# Patient Record
Sex: FEMALE | Race: WHITE | URBAN - METROPOLITAN AREA
[De-identification: names, ages, dates, MRNs, and addresses within clinical notes are randomized per-mention and may not be internally consistent; named-entity substitution may affect disease eponyms.]

---

## 2017-03-09 ENCOUNTER — HOSPITAL ENCOUNTER (OUTPATIENT)
Dept: SURGERY | Age: 32
Discharge: HOME | End: 2017-03-09
Attending: PODIATRIST | Admitting: PODIATRIST

## 2017-03-09 VITALS
SYSTOLIC BLOOD PRESSURE: 125 MMHG | HEIGHT: 68 IN | HEART RATE: 95 BPM | BODY MASS INDEX: 27.92 KG/M2 | TEMPERATURE: 97.9 F | DIASTOLIC BLOOD PRESSURE: 88 MMHG | WEIGHT: 184.2 LBS | RESPIRATION RATE: 17 BRPM | OXYGEN SATURATION: 99 %

## 2017-03-09 LAB — HCG(URINE) PREGNANCY TEST: NEGATIVE

## 2017-03-09 RX ORDER — SODIUM CHLORIDE 9 MG/ML
INJECTION, SOLUTION INTRAVENOUS CONTINUOUS
Status: CANCELLED | OUTPATIENT
Start: 2017-03-09

## 2017-03-09 RX ORDER — SODIUM CHLORIDE 0.9 % (FLUSH) 0.9 %
10 SYRINGE (ML) INJECTION EVERY 12 HOURS SCHEDULED
Status: CANCELLED | OUTPATIENT
Start: 2017-03-09

## 2017-03-09 RX ORDER — FENTANYL CITRATE 50 UG/ML
25 INJECTION, SOLUTION INTRAMUSCULAR; INTRAVENOUS EVERY 5 MIN PRN
Status: DISCONTINUED | OUTPATIENT
Start: 2017-03-09 | End: 2017-03-10 | Stop reason: HOSPADM

## 2017-03-09 RX ORDER — SODIUM CHLORIDE, SODIUM LACTATE, POTASSIUM CHLORIDE, CALCIUM CHLORIDE 600; 310; 30; 20 MG/100ML; MG/100ML; MG/100ML; MG/100ML
INJECTION, SOLUTION INTRAVENOUS CONTINUOUS
Status: DISCONTINUED | OUTPATIENT
Start: 2017-03-09 | End: 2017-03-10 | Stop reason: HOSPADM

## 2017-03-09 RX ORDER — LIDOCAINE HYDROCHLORIDE 10 MG/ML
0.5 INJECTION, SOLUTION EPIDURAL; INFILTRATION; INTRACAUDAL; PERINEURAL ONCE
Status: DISCONTINUED | OUTPATIENT
Start: 2017-03-09 | End: 2017-03-10 | Stop reason: HOSPADM

## 2017-03-09 RX ORDER — SODIUM CHLORIDE 0.9 % (FLUSH) 0.9 %
10 SYRINGE (ML) INJECTION PRN
Status: CANCELLED | OUTPATIENT
Start: 2017-03-09

## 2017-03-09 RX ORDER — HYDROMORPHONE HCL 110MG/55ML
0.5 PATIENT CONTROLLED ANALGESIA SYRINGE INTRAVENOUS EVERY 5 MIN PRN
Status: DISCONTINUED | OUTPATIENT
Start: 2017-03-09 | End: 2017-03-10 | Stop reason: HOSPADM

## 2017-03-09 RX ORDER — MORPHINE SULFATE 2 MG/ML
1 INJECTION, SOLUTION INTRAMUSCULAR; INTRAVENOUS EVERY 5 MIN PRN
Status: DISCONTINUED | OUTPATIENT
Start: 2017-03-09 | End: 2017-03-10 | Stop reason: HOSPADM

## 2017-03-09 RX ORDER — HYDROCODONE BITARTRATE AND ACETAMINOPHEN 5; 325 MG/1; MG/1
1-2 TABLET ORAL EVERY 4 HOURS PRN
Qty: 35 TABLET | Refills: 0 | Status: SHIPPED | OUTPATIENT
Start: 2017-03-09 | End: 2017-03-16

## 2017-03-09 RX ORDER — ONDANSETRON 2 MG/ML
4 INJECTION INTRAMUSCULAR; INTRAVENOUS
Status: ACTIVE | OUTPATIENT
Start: 2017-03-09 | End: 2017-03-09

## 2017-03-09 RX ORDER — CEFAZOLIN SODIUM 2 G/100ML
2 INJECTION, SOLUTION INTRAVENOUS
Status: COMPLETED | OUTPATIENT
Start: 2017-03-09 | End: 2017-03-09

## 2017-03-09 RX ORDER — DIPHENHYDRAMINE HYDROCHLORIDE 50 MG/ML
25 INJECTION INTRAMUSCULAR; INTRAVENOUS PRN
Status: DISCONTINUED | OUTPATIENT
Start: 2017-03-09 | End: 2017-03-10 | Stop reason: HOSPADM

## 2017-03-09 RX ADMIN — CEFAZOLIN SODIUM 2 G: 2 INJECTION, SOLUTION INTRAVENOUS at 15:30

## 2017-03-09 RX ADMIN — SODIUM CHLORIDE, SODIUM LACTATE, POTASSIUM CHLORIDE, CALCIUM CHLORIDE: 600; 310; 30; 20 INJECTION, SOLUTION INTRAVENOUS at 14:51

## 2017-03-09 ASSESSMENT — PAIN SCALES - GENERAL
PAINLEVEL_OUTOF10: 0

## 2017-03-09 ASSESSMENT — PAIN - FUNCTIONAL ASSESSMENT: PAIN_FUNCTIONAL_ASSESSMENT: 0-10

## 2017-03-15 LAB
HPV COMMENT: ABNORMAL
HPV TYPE 16: NOT DETECTED
HPV TYPE 18: NOT DETECTED
HPVOH (OTHER TYPES): DETECTED

## 2017-05-05 LAB
HCT VFR BLD CALC: 40.5 % (ref 35.8–46.5)
HEMOGLOBIN: 13.7 G/DL (ref 12.1–15.8)
MCH RBC QN AUTO: 30.7 PG (ref 28.4–33.4)
MCHC RBC AUTO-ENTMCNC: 33.9 G/DL (ref 31.1–37)
MCV RBC AUTO: 90.4 FL (ref 85–99)
PDW BLD-RTO: 13.2 % (ref 11.7–15.2)
PLATELET # BLD: 202 10*3/UL (ref 154–393)
RBC # BLD: 4.48 10*6/UL (ref 3.86–5.17)
WBC: 5.7 10*3/UL (ref 4–10.5)

## 2017-05-11 LAB — PREGNANCY, URINE: NEGATIVE

## 2017-09-18 ENCOUNTER — HOSPITAL ENCOUNTER (OUTPATIENT)
Dept: OTHER | Age: 32
Discharge: HOME | End: 2017-09-18
Attending: OBSTETRICS & GYNECOLOGY | Admitting: OBSTETRICS & GYNECOLOGY

## 2017-09-18 LAB
A/G RATIO: 1.4 (ref 1.1–2.2)
ABO/RH: NORMAL
ALBUMIN SERPL-MCNC: 3.9 G/DL (ref 3.4–5)
ALP BLD-CCNC: 67 U/L (ref 40–129)
ALT SERPL-CCNC: 10 U/L (ref 10–40)
ANION GAP SERPL CALCULATED.3IONS-SCNC: 12 MMOL/L (ref 3–16)
ANTIBODY SCREEN: NORMAL
AST SERPL-CCNC: 13 U/L (ref 15–37)
BASOPHILS ABSOLUTE: 0.1 K/UL (ref 0–0.2)
BASOPHILS RELATIVE PERCENT: 1 %
BILIRUB SERPL-MCNC: 0.3 MG/DL (ref 0–1)
BUN BLDV-MCNC: 15 MG/DL (ref 7–20)
CALCIUM SERPL-MCNC: 8.8 MG/DL (ref 8.3–10.6)
CHLORIDE BLD-SCNC: 104 MMOL/L (ref 99–110)
CO2: 25 MMOL/L (ref 21–32)
CREAT SERPL-MCNC: 0.8 MG/DL (ref 0.6–1.1)
EOSINOPHILS ABSOLUTE: 0.1 K/UL (ref 0–0.6)
EOSINOPHILS RELATIVE PERCENT: 2.1 %
GFR AFRICAN AMERICAN: >60
GFR NON-AFRICAN AMERICAN: >60
GLOBULIN: 2.7 G/DL
GLUCOSE BLD-MCNC: 100 MG/DL (ref 70–99)
HCT VFR BLD CALC: 40.9 % (ref 36–48)
HEMOGLOBIN: 13.8 G/DL (ref 12–16)
LYMPHOCYTES ABSOLUTE: 1.4 K/UL (ref 1–5.1)
LYMPHOCYTES RELATIVE PERCENT: 23.5 %
MCH RBC QN AUTO: 31.5 PG (ref 26–34)
MCHC RBC AUTO-ENTMCNC: 33.8 G/DL (ref 31–36)
MCV RBC AUTO: 93.4 FL (ref 80–100)
MONOCYTES ABSOLUTE: 0.2 K/UL (ref 0–1.3)
MONOCYTES RELATIVE PERCENT: 4.3 %
NEUTROPHILS ABSOLUTE: 4 K/UL (ref 1.7–7.7)
NEUTROPHILS RELATIVE PERCENT: 69.1 %
PDW BLD-RTO: 12.8 % (ref 12.4–15.4)
PLATELET # BLD: 208 K/UL (ref 135–450)
PMV BLD AUTO: 9.4 FL (ref 5–10.5)
POTASSIUM SERPL-SCNC: 3.9 MMOL/L (ref 3.5–5.1)
RBC # BLD: 4.37 M/UL (ref 4–5.2)
SODIUM BLD-SCNC: 141 MMOL/L (ref 136–145)
TOTAL PROTEIN: 6.6 G/DL (ref 6.4–8.2)
WBC # BLD: 5.8 K/UL (ref 4–11)

## 2017-09-28 PROBLEM — N87.9 CERVICAL DYSPLASIA: Status: ACTIVE | Noted: 2017-09-28

## 2018-05-24 LAB
HPV COMMENT: NORMAL
HPV TYPE 16: NOT DETECTED
HPV TYPE 18: NOT DETECTED
HPVOH (OTHER TYPES): NOT DETECTED

## 2019-07-15 LAB
HEPATITIS B SURFACE ANTIGEN INTERPRETATION: NORMAL
HEPATITIS C ANTIBODY INTERPRETATION: NORMAL

## 2019-07-16 LAB
HIV AG/AB: NORMAL
HIV ANTIGEN: NORMAL
HIV-1 ANTIBODY: NORMAL
HIV-2 AB: NORMAL
TOTAL SYPHILLIS IGG/IGM: NORMAL

## 2020-07-17 ENCOUNTER — OFFICE VISIT (OUTPATIENT)
Dept: BARIATRICS/WEIGHT MGMT | Age: 35
End: 2020-07-17
Payer: COMMERCIAL

## 2020-07-17 VITALS
BODY MASS INDEX: 32.49 KG/M2 | RESPIRATION RATE: 18 BRPM | SYSTOLIC BLOOD PRESSURE: 116 MMHG | HEIGHT: 68 IN | DIASTOLIC BLOOD PRESSURE: 78 MMHG | WEIGHT: 214.4 LBS | OXYGEN SATURATION: 97 % | HEART RATE: 90 BPM

## 2020-07-17 PROBLEM — E66.9 OBESITY (BMI 30-39.9): Status: ACTIVE | Noted: 2020-07-17

## 2020-07-17 PROCEDURE — 99204 OFFICE O/P NEW MOD 45 MIN: CPT | Performed by: SURGERY

## 2020-07-17 NOTE — PROGRESS NOTES
Efrain Ellison is a 30 y.o. female with a date of birth of 1985. There were no vitals filed for this visit. BMI: There is no height or weight on file to calculate BMI. Obesity Classification: Class I    Weight History: Wt Readings from Last 3 Encounters:   09/28/17 190 lb 7.6 oz (86.4 kg)   08/31/17 190 lb (86.2 kg)   03/09/17 184 lb 3.2 oz (83.6 kg)       Patient's lowest adult weight was 160 lb at age 34. Patient's highest adult weight was 214.4 lb at age 30. Patient has participated in the following weight loss programs: has not. Patient has not participated in meal replacement/liquid diets. Patient has not participated in weight loss medications. Patient is not lactose intolerant. Patient does not have [employer]/cultural food concerns. Patient does not have food allergies. Does have regular sleep schedule - midnight until 9a. Pt reports eating 2-3x/day, has noticed increased snacking since Covid 19 began. No structure exercise currently. 24 hour recall/food frequency chart:   Breakfast: yes. Skips frequently r/t covid 19 schedule - Luna crunch wrap ([employer]aco [employer])  Snack: no.   Lunch: yes. - Deli sandwich OR salad OR fast food  Snack: no.   Dinner: yes. 2 soft tacos, lettuce, cheese, meat, sour cream OR spaghetti and meatballs OR pizza OR grilled protein with corn on cob / baked potato  Snack: yes. sunflower seeds, slim hoda  Drinks throughout the day: 4 cokes daily  Do you drink alcohol? yes. How often/how much alcohol do you drink: 2 Beers and 2 Mixed drinks per month. Patient does not meet the criteria for binge eating disorder. Patient does not have grazing. Patient does not have night eating. Patient does have a history of eating out of boredom.       Goals  Weight: 175  Health Improvement: increased quality of life, increased energy, decreased back pain    Assessment  Nutritional Needs: RMR=(9.99 x 97.5) + (6.25 x 172.7) - (4.92 x 34 y.o.) -161  = 1734 kcal x 1.4 no melena

## 2020-07-17 NOTE — PROGRESS NOTES
[facility]) Physicians   [facility]  Jax Bryson MD, [address] [facility], [address] [place] [address]    [place]  [id number] . Phone: [phone]  Fax: [phone]       Medical Weight Loss Consultation         Chief Complaint   Patient presents with    Bariatric, Initial Visit     NP, LUIZA         HPI:    Hillary Zaragoza is a very pleasant 30 y.o. obese female ,   Body mass index is 32.6 kg/m². And multiple medical problems who is presenting via telemedicine for weight loss evaluation and consultation by Dr. Mary Rubalcava.    Patient has been struggling for several years now with obesity. Patient feels the weight is an obstacle to achieve and perform things in  daily living and is posing risk on health. Tries to diet, and exercise but can't keep the weight off. Patient tried other regimens, but with no sustainable weight loss. Patient  is very determined to lose weight and be healthy, and is interested in joining our weight loss program to achieve this goal.    Otherwise patient denies any nausea, vomiting, fevers, chills, shortness of breath, chest pain, constipation or urinary symptoms.       Pain Assessment   Denies any abdominal pain     Past Medical History:   Diagnosis Date    Back pain      Past Surgical History:   Procedure Laterality Date     SECTION      FOOT SURGERY Left 2017    left 5th toe condylectomy    HYSTERECTOMY, TOTAL ABDOMINAL  2017    total laparoscopic hysterectomy    LEEP      WISDOM TOOTH EXTRACTION       Family History   Problem Relation Age of Onset    Alcohol Abuse Mother     Asthma Mother     Cancer Father         lung    Alcohol Abuse Father     COPD Father     Stroke Maternal Grandmother      Social History     Tobacco Use    Smoking status: Current Every Day Smoker     Packs/day: 1.00     Years: 10.00     Pack years: 10.00    Smokeless tobacco: Never Used   Substance Use Topics    Alcohol use: Yes     Comment: occasional I counseled the patient on the importance of not smoking and risks of ETOH. No Known Allergies  Vitals:    07/17/20 0701   BP: 116/78   Pulse: 90   Resp: 18   SpO2: 97%   Weight: 214 lb 6.4 oz (97.3 kg)   Height: 5' 8\" (1.727 m)       Body mass index is 32.6 kg/m². Current Outpatient Medications:     ibuprofen (ADVIL;MOTRIN) 800 MG tablet, Take 1 tablet by mouth every 6 hours as needed for Pain, Disp: 40 tablet, Rfl: 3      Review of Systems - History obtained from the patient  General ROS: negative  Psychological ROS: negative  Ophthalmic ROS: negative  Neurological ROS: negative  ENT ROS: negative  Allergy and Immunology ROS: negative  Hematological and Lymphatic ROS: negative  Endocrine ROS: negative  Breast ROS: negative  Respiratory ROS: negative  Cardiovascular ROS: negative  Gastrointestinal ROS:negative  Genito-Urinary ROS: negative  Musculoskeletal ROS: joint pain  Skin ROS: negative      Physical Exam   Constitutional: Patient is oriented to person, place, and time. Vital signs are normal. Patient  appears well-developed and well-nourished. Patient  is active and cooperative. Non-toxic appearance. No distress. HENT:   Head: Normocephalic and atraumatic. Head is without laceration. Right Ear: External ear normal. No lacerations. No drainage, swelling or tenderness. Left Ear: External ear normal. No lacerations. No drainage, swelling or tenderness. Nose: Nose normal. No nose lacerations or nasal deformity. Mouth/Throat: Patient is wearing mask due to Covid-19 pandemic precautions, following CDC and health authorities guidelines. Eyes: Conjunctivae, EOM and lids are normal. Pupils are equal, round, and reactive to light. Right eye exhibits no discharge. No foreign body present in the right eye. Left eye exhibits no discharge. No foreign body present in the left eye. No scleral icterus. Neck: Trachea normal and normal range of motion. Neck supple. No JVD present.  No tracheal tenderness present. Carotid bruit is not present. No rigidity. No tracheal deviation and no edema present. No thyromegaly present. Cardiovascular: Normal rate, regular rhythm, normal heart sounds, intact distal pulses and normal pulses. Pulmonary/Chest: Effort normal and breath sounds normal. No stridor. No respiratory distress. Patient  has no wheezes. Patient has no rales. Patient exhibits no tenderness and no crepitus. Abdominal: Soft. Normal appearance and bowel sounds are normal. Patient exhibits no distension, no abdominal bruit, no ascites and no mass. There is no hepatosplenomegaly. There is no tenderness. There is no rigidity, no rebound, no guarding and no CVA tenderness. No hernia. Hernia confirmed negative in the ventral area. Musculoskeletal: Normal range of motion. Patient exhibits no edema or tenderness. Lymphadenopathy:        Head (right side): No submental, no submandibular, no preauricular, no posterior auricular and no occipital adenopathy present. Head (left side): No submental, no submandibular, no preauricular, no posterior auricular and no occipital adenopathy present. Patient  has no cervical adenopathy. Right: No supraclavicular adenopathy present. Left: No supraclavicular adenopathy present. Neurological: Patient is alert and oriented to person, place, and time. Patient has normal strength. Coordination and gait normal. GCS eye subscore is 4. GCS verbal subscore is 5. GCS motor subscore is 6. Skin: Skin is warm and dry. No abrasion and no rash noted. Patient  is not diaphoretic. No cyanosis or erythema. Psychiatric: Patient has a normal mood and affect. speech is normal and behavior is normal. Cognition and memory are normal.       Rohan Cassidy was seen today for bariatric, initial visit.     Diagnoses and all orders for this visit:    Obesity (BMI 30-39.9)  -     EKG 12 Lead; Future  -     CBC Auto Differential; Future  -     Comprehensive Metabolic Panel; Future  -     Hemoglobin A1C; Future  -     Iron and TIBC; Future  -     Lipid Panel; Future  -     TSH with Reflex; Future  -     Vitamin A; Future  -     Vitamin B1, Whole Blood; Future  -     Vitamin B12 & Folate; Future  -     Vitamin D 25 Hydroxy; Future  -     Vitamin E; Future  -     Protime-INR; Future          A/P    Delfin Kaur is a very pleasant 30 y.o. female with Obesity,  Body mass index is 32.6 kg/m². and multiple obesity related co-morbidities. Delfin Kaur is very motivated to lose weight and being more healthy. The patient underwent extensive dietary counseling. I have reviewed, discussed and agree with the dietary plan and coordinated treatment plan with dietitian. Co-morbidities include but not limited to,  specific to the patient obesity; Patient Active Problem List   Diagnosis    Cervical dysplasia    Obesity (BMI 30-39. 9)         I believe patient is an excellent candidate for weight loss, and doing so will help the patient with avoiding / improving obesity related comorbid conditions. Patient was advised on healthy diet habits and regular exercise. Medical weight loss and different surgical options were discussed in details with patient (Body mass index is 32.6 kg/m². ). Patient is interested in medical weight loss. Medical weight loss options include but not limited to ; Anti-Obesity Medications (AOM) for weight loss. If, following the complete review of the patient's medical history and current medications, the patient is not considered an appropriate candidate for AOM then they may be a candidate for our [facility] program based on their lab and EKG results. I have explained the above treatment plan to the patient, who verbalized agreement with plan. Patient interested in weight loss medications. Patient received dietary handouts and education via email.  Also discussed in details the importance of follow up, as well following the recommendations and completing the whole program to improve outcomes when it comes to healthier lifestyle as well weight loss. Patient also advised about risks and benefits being on a strict dietary regimen as well using supplements. Also discussed in details the different medication options for weight loss possible side effects and interaction with other medications. Patient questions answered. Patient agrees and wants to proceed with weight loss planning. Obesity as a disease is considered a high risk to patients overall health and should therefore be considered a high risk disease state. Now with Covid-19 pandemic, CDC and health authorities does classify obese patients as vulnerable and high risk as well. Which makes weight loss a priority for improvement of their wellbeing and overall health. CDC has issued the following statement as far Obese patients being at Increased Risk of being critically ill from SARS-Cov-2  \"Severe obesity increases the risk of a serious breathing problem called acute respiratory distress syndrome (ARDS), which is a major complication of ZSIZQ-[id number] and can cause difficulties with a doctors ability to provide respiratory support for seriously ill patients. People living with severe obesity can have multiple serious chronic diseases and underlying health conditions that can increase the risk of severe illness from COVID-19. \"           1- Medical Weight Loss. 2- Meal Plan provided.   3- Labs  4- EKG  5- Behavioral therapy &/or support groups  6- RTC in 1 month

## 2020-07-20 LAB
HEPATITIS B SURFACE ANTIGEN INTERPRETATION: NORMAL
HEPATITIS C ANTIBODY INTERPRETATION: NORMAL

## 2021-04-15 LAB
HEPATITIS B SURFACE ANTIGEN INTERPRETATION: NORMAL
HEPATITIS C ANTIBODY INTERPRETATION: NORMAL
